# Patient Record
(demographics unavailable — no encounter records)

---

## 2025-01-03 NOTE — PHYSICAL EXAM
[Healthy Appearing] : healthy appearing [Well Nourished] : well nourished [Interactive] : interactive [Normal Appearance] : normal appearance [Well formed] : well formed [Normally Set] : normally set [Normal S1 and S2] : normal S1 and S2 [Murmur] : no murmurs [Clear to Ausculation Bilaterally] : clear to auscultation bilaterally [Abdomen Soft] : soft [Abdomen Tenderness] : non-tender [] : no hepatosplenomegaly [1] : was Jaylen stage 1 [Jaylen Stage ___] : the Jaylen stage for breast development was [unfilled] [Normal] : normal  [FreeTextEntry2] : None

## 2025-01-03 NOTE — HISTORY OF PRESENT ILLNESS
[Premenarchal] : premenarchal [FreeTextEntry2] : Kimberli is a 6y female here for follow up of premature thelarche.   Kimberli reports that she has been well since last visit. Mother denies progression in Kimberli's breast tissue. Mother reports that Annie has been outgrowing clothes and shoes (size 2). Mother denies headaches, vision changes, abdominal pains in Kimberli. Mother denies the presence of discharge in Kimberli's underwear. Mother reports that Kimberli is more physically active.   Since last visit, Annie has grown 3.9 cm and gained 4 lbs. Height velocity 7.4 cm/yr. Mother's height 64 inches. Father's height 73 inches Mid-parental sex-adjusted target height 65.5 inches.  Annie underwent leuprolide stimulation testing to further understand her pituitary-gonadal axis on 4.24.2024 which revealed prepubertal elevations of her LH and estradiol with predominant FSH, consistent with benign premature thelarche

## 2025-01-03 NOTE — ASSESSMENT
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 Her gonadotropins both stimulated and unstimulated are in the prepubertal range. She has a BMI in the obese range and continues to gain weight. She has a history of an advanced bone age, which could be secondary to her adiposity. Her breast tissue has increased since last visit.   Plan - Obtain bone age x-ray before next visit. - If breast tissue continues to advance, we will consider repeating gonadotropins and estradiol after next visit.  - Continue to monitor growth and development.  - Continue to increase active lifestyle.  - Decrease caloric intake.  - Follow up in 6 months, sooner if needed.   Marcelina Booker MD Pediatric Endocrinology [address], [address] [place] [address] [phone]

## 2025-01-03 NOTE — CONSULT LETTER
[Dear  ___] : Dear  [unfilled], [Courtesy Letter:] : I had the pleasure of seeing your patient, [unfilled], in my office today. [Please see my note below.] : Please see my note below. [Consult Closing:] : Thank you very much for allowing me to participate in the care of this patient.  If you have any questions, please do not hesitate to contact me. [Sincerely,] : Sincerely, [FreeTextEntry3] : Marcelina Booker MD Pediatric Endocrinologist